# Patient Record
Sex: FEMALE | URBAN - METROPOLITAN AREA
[De-identification: names, ages, dates, MRNs, and addresses within clinical notes are randomized per-mention and may not be internally consistent; named-entity substitution may affect disease eponyms.]

---

## 2022-12-02 ENCOUNTER — OFFICE VISIT (OUTPATIENT)
Dept: URBAN - METROPOLITAN AREA CLINIC 3 | Facility: CLINIC | Age: 57
End: 2022-12-02
Payer: COMMERCIAL

## 2022-12-02 DIAGNOSIS — H57.12 OCULAR PAIN, LEFT EYE: ICD-10-CM

## 2022-12-02 DIAGNOSIS — H20.012 PRIMARY IRIDOCYCLITIS, LEFT EYE: Primary | ICD-10-CM

## 2022-12-02 DIAGNOSIS — H43.813 VITREOUS DEGENERATION, BILATERAL: ICD-10-CM

## 2022-12-02 PROCEDURE — 92250 FUNDUS PHOTOGRAPHY W/I&R: CPT | Performed by: OPTOMETRIST

## 2022-12-02 PROCEDURE — 99204 OFFICE O/P NEW MOD 45 MIN: CPT | Performed by: OPTOMETRIST

## 2022-12-02 PROCEDURE — 92133 CPTRZD OPH DX IMG PST SGM ON: CPT | Performed by: OPTOMETRIST

## 2022-12-02 RX ORDER — VALACYCLOVIR HYDROCHLORIDE 500 MG/1
500 MG TABLET, FILM COATED ORAL
Qty: 24 | Refills: 0 | Status: INACTIVE
Start: 2022-12-02 | End: 2022-12-15

## 2022-12-02 RX ORDER — PREDNISOLONE ACETATE 10 MG/ML
1 % SUSPENSION/ DROPS OPHTHALMIC
Qty: 5 | Refills: 0 | Status: INACTIVE
Start: 2022-12-02 | End: 2022-12-13

## 2022-12-02 ASSESSMENT — INTRAOCULAR PRESSURE
OS: 10
OD: 10

## 2022-12-02 NOTE — IMPRESSION/PLAN
Impression: Ocular pain, left eye: H57.12. Plan: MOCT confirms no disc edema. Fundus photos taken today. No ophthalmic etiology for pain in around eye. Follow-up with Primary Care Provider for evaluation and management.

## 2022-12-02 NOTE — IMPRESSION/PLAN
Impression: Primary iridocyclitis, left eye: H20.012. Plan: Start prednisolone acetate every 2 hours while awake for 2 days then four times a day for 4 days then, three times a day for 3 days then, twice a day for 2 days then, one time a day for 1 day then, STOP.(shake well) Start Valtrex 500 mg TID for 14 days. Return immediately for increased pain, increased redness, or decreased vision.

## 2023-02-13 ENCOUNTER — OFFICE VISIT (OUTPATIENT)
Dept: URBAN - METROPOLITAN AREA CLINIC 3 | Facility: CLINIC | Age: 58
End: 2023-02-13
Payer: COMMERCIAL

## 2023-02-13 DIAGNOSIS — H52.223 REGULAR ASTIGMATISM, BILATERAL: ICD-10-CM

## 2023-02-13 DIAGNOSIS — L98.7 EXCESSIVE AND REDUNDANT SKIN AND SUBCUTANEOUS TISSUE: ICD-10-CM

## 2023-02-13 DIAGNOSIS — Q14.2 CONGENITAL MALFORMATION OF OPTIC DISC: Primary | ICD-10-CM

## 2023-02-13 DIAGNOSIS — H20.012 PRIMARY IRIDOCYCLITIS, LEFT EYE: ICD-10-CM

## 2023-02-13 DIAGNOSIS — H43.313 VITREOUS MEMBRANES AND STRANDS, BILATERAL: ICD-10-CM

## 2023-02-13 PROCEDURE — 99212 OFFICE O/P EST SF 10 MIN: CPT | Performed by: OPTOMETRIST

## 2023-02-13 PROCEDURE — 92133 CPTRZD OPH DX IMG PST SGM ON: CPT | Performed by: OPTOMETRIST

## 2023-02-13 ASSESSMENT — VISUAL ACUITY
OD: 20/20
OS: 20/20

## 2023-02-13 ASSESSMENT — INTRAOCULAR PRESSURE
OD: 14
OS: 14

## 2023-02-13 NOTE — IMPRESSION/PLAN
Impression: Excessive and redundant skin and subcutaneous tissue: L98.7. Plan: Unknown etiology. Patient is symptomatic.  Recommend evaluation with oculoplastics specialist.

## 2023-02-13 NOTE — IMPRESSION/PLAN
Impression: Primary iridocyclitis, left eye: H20.012. Plan: Resolved. Finished prednisolone taper as prescribed.

## 2023-02-13 NOTE — IMPRESSION/PLAN
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 Plan: Longstanding. Will continue to monitor.  RNFL 92/92 is normal.